# Patient Record
Sex: MALE | Race: WHITE | NOT HISPANIC OR LATINO | URBAN - METROPOLITAN AREA
[De-identification: names, ages, dates, MRNs, and addresses within clinical notes are randomized per-mention and may not be internally consistent; named-entity substitution may affect disease eponyms.]

---

## 2017-01-01 ENCOUNTER — INPATIENT (INPATIENT)
Facility: HOSPITAL | Age: 22
LOS: 9 days | Discharge: HOME | End: 2017-01-11
Attending: PSYCHIATRY & NEUROLOGY | Admitting: PSYCHIATRY & NEUROLOGY
Payer: COMMERCIAL

## 2017-01-01 VITALS
OXYGEN SATURATION: 100 % | SYSTOLIC BLOOD PRESSURE: 153 MMHG | TEMPERATURE: 98 F | DIASTOLIC BLOOD PRESSURE: 96 MMHG | RESPIRATION RATE: 16 BRPM | HEART RATE: 116 BPM

## 2017-01-01 DIAGNOSIS — F29 UNSPECIFIED PSYCHOSIS NOT DUE TO A SUBSTANCE OR KNOWN PHYSIOLOGICAL CONDITION: ICD-10-CM

## 2017-01-01 DIAGNOSIS — R69 ILLNESS, UNSPECIFIED: ICD-10-CM

## 2017-01-01 LAB
ALBUMIN SERPL ELPH-MCNC: 5 G/DL — SIGNIFICANT CHANGE UP (ref 3.3–5)
ALP SERPL-CCNC: 57 U/L — LOW (ref 60–270)
ALT FLD-CCNC: 27 U/L — SIGNIFICANT CHANGE UP (ref 4–41)
APAP SERPL-MCNC: < 15 UG/ML — LOW (ref 15–25)
AST SERPL-CCNC: 24 U/L — SIGNIFICANT CHANGE UP (ref 4–40)
BARBITURATES MEASUREMENT: NEGATIVE — SIGNIFICANT CHANGE UP
BASOPHILS # BLD AUTO: 0.03 K/UL — SIGNIFICANT CHANGE UP (ref 0–0.2)
BASOPHILS NFR BLD AUTO: 0.3 % — SIGNIFICANT CHANGE UP (ref 0–2)
BENZODIAZ SERPL-MCNC: NEGATIVE — SIGNIFICANT CHANGE UP
BILIRUB SERPL-MCNC: 0.4 MG/DL — SIGNIFICANT CHANGE UP (ref 0.2–1.2)
BUN SERPL-MCNC: 21 MG/DL — SIGNIFICANT CHANGE UP (ref 7–23)
CALCIUM SERPL-MCNC: 9.6 MG/DL — SIGNIFICANT CHANGE UP (ref 8.4–10.5)
CHLORIDE SERPL-SCNC: 100 MMOL/L — SIGNIFICANT CHANGE UP (ref 98–107)
CO2 SERPL-SCNC: 22 MMOL/L — SIGNIFICANT CHANGE UP (ref 22–31)
CREAT SERPL-MCNC: 0.8 MG/DL — SIGNIFICANT CHANGE UP (ref 0.5–1.3)
EOSINOPHIL # BLD AUTO: 0 K/UL — SIGNIFICANT CHANGE UP (ref 0–0.5)
EOSINOPHIL NFR BLD AUTO: 0 % — SIGNIFICANT CHANGE UP (ref 0–6)
ETHANOL BLD-MCNC: < 10 MG/DL — SIGNIFICANT CHANGE UP
GLUCOSE SERPL-MCNC: 104 MG/DL — HIGH (ref 70–99)
HCT VFR BLD CALC: 51.1 % — HIGH (ref 39–50)
HGB BLD-MCNC: 16.7 G/DL — SIGNIFICANT CHANGE UP (ref 13–17)
IMM GRANULOCYTES NFR BLD AUTO: 0.2 % — SIGNIFICANT CHANGE UP (ref 0–1.5)
LYMPHOCYTES # BLD AUTO: 2.61 K/UL — SIGNIFICANT CHANGE UP (ref 1–3.3)
LYMPHOCYTES # BLD AUTO: 24.7 % — SIGNIFICANT CHANGE UP (ref 13–44)
MCHC RBC-ENTMCNC: 29.4 PG — SIGNIFICANT CHANGE UP (ref 27–34)
MCHC RBC-ENTMCNC: 32.7 % — SIGNIFICANT CHANGE UP (ref 32–36)
MCV RBC AUTO: 90 FL — SIGNIFICANT CHANGE UP (ref 80–100)
MONOCYTES # BLD AUTO: 0.5 K/UL — SIGNIFICANT CHANGE UP (ref 0–0.9)
MONOCYTES NFR BLD AUTO: 4.7 % — SIGNIFICANT CHANGE UP (ref 2–14)
NEUTROPHILS # BLD AUTO: 7.4 K/UL — SIGNIFICANT CHANGE UP (ref 1.8–7.4)
NEUTROPHILS NFR BLD AUTO: 70.1 % — SIGNIFICANT CHANGE UP (ref 43–77)
PLATELET # BLD AUTO: 178 K/UL — SIGNIFICANT CHANGE UP (ref 150–400)
PMV BLD: 13.3 FL — HIGH (ref 7–13)
POTASSIUM SERPL-MCNC: 4 MMOL/L — SIGNIFICANT CHANGE UP (ref 3.5–5.3)
POTASSIUM SERPL-SCNC: 4 MMOL/L — SIGNIFICANT CHANGE UP (ref 3.5–5.3)
PROT SERPL-MCNC: 8.1 G/DL — SIGNIFICANT CHANGE UP (ref 6–8.3)
RBC # BLD: 5.68 M/UL — SIGNIFICANT CHANGE UP (ref 4.2–5.8)
RBC # FLD: 13.2 % — SIGNIFICANT CHANGE UP (ref 10.3–14.5)
SALICYLATES SERPL-MCNC: < 5 MG/DL — LOW (ref 15–30)
SODIUM SERPL-SCNC: 142 MMOL/L — SIGNIFICANT CHANGE UP (ref 135–145)
TSH SERPL-MCNC: 2.04 UIU/ML — SIGNIFICANT CHANGE UP (ref 0.5–4.3)
WBC # BLD: 10.56 K/UL — HIGH (ref 3.8–10.5)
WBC # FLD AUTO: 10.56 K/UL — HIGH (ref 3.8–10.5)

## 2017-01-01 PROCEDURE — 99285 EMERGENCY DEPT VISIT HI MDM: CPT

## 2017-01-01 RX ORDER — BENZTROPINE MESYLATE 1 MG
0.5 TABLET ORAL ONCE
Qty: 0 | Refills: 0 | Status: COMPLETED | OUTPATIENT
Start: 2017-01-01 | End: 2017-01-01

## 2017-01-01 RX ORDER — HALOPERIDOL DECANOATE 100 MG/ML
5 INJECTION INTRAMUSCULAR ONCE
Qty: 0 | Refills: 0 | Status: DISCONTINUED | OUTPATIENT
Start: 2017-01-01 | End: 2017-01-11

## 2017-01-01 RX ORDER — BENZTROPINE MESYLATE 1 MG
0.5 TABLET ORAL
Qty: 0 | Refills: 0 | Status: DISCONTINUED | OUTPATIENT
Start: 2017-01-01 | End: 2017-01-02

## 2017-01-01 RX ORDER — DIPHENHYDRAMINE HCL 50 MG
50 CAPSULE ORAL ONCE
Qty: 0 | Refills: 0 | Status: DISCONTINUED | OUTPATIENT
Start: 2017-01-01 | End: 2017-01-11

## 2017-01-01 RX ORDER — HALOPERIDOL DECANOATE 100 MG/ML
5 INJECTION INTRAMUSCULAR ONCE
Qty: 0 | Refills: 0 | Status: COMPLETED | OUTPATIENT
Start: 2017-01-01 | End: 2017-01-01

## 2017-01-01 RX ORDER — HALOPERIDOL DECANOATE 100 MG/ML
5 INJECTION INTRAMUSCULAR EVERY 4 HOURS
Qty: 0 | Refills: 0 | Status: DISCONTINUED | OUTPATIENT
Start: 2017-01-01 | End: 2017-01-11

## 2017-01-01 RX ORDER — DIPHENHYDRAMINE HCL 50 MG
50 CAPSULE ORAL EVERY 4 HOURS
Qty: 0 | Refills: 0 | Status: DISCONTINUED | OUTPATIENT
Start: 2017-01-01 | End: 2017-01-11

## 2017-01-01 RX ADMIN — Medication 0.5 MILLIGRAM(S): at 20:31

## 2017-01-01 RX ADMIN — HALOPERIDOL DECANOATE 5 MILLIGRAM(S): 100 INJECTION INTRAMUSCULAR at 14:12

## 2017-01-01 RX ADMIN — Medication 0.5 MILLIGRAM(S): at 14:12

## 2017-01-01 NOTE — ED BEHAVIORAL HEALTH ASSESSMENT NOTE - DESCRIPTION
Oddly related, giggling, laughing inappropriately, maintained behavioral control, offered Haldol 5 mg po at 1330, which patient agreed to take. None known Single, domiciled with family in [facility] ed. student attending [employer]

## 2017-01-01 NOTE — ED BEHAVIORAL HEALTH ASSESSMENT NOTE - RISK ASSESSMENT
Risk factors- male gender, psychosis, medication non-compliant, psychosocial stressors.  Protective factors- supportive family, domiciled, no history of suicide attempts, no substance abuse, legal issues or access to firearms.  Patient is an acute risk, meets criteria for inpatient hospitalization and will be hospitalized for safety and stabilization.

## 2017-01-01 NOTE — ED BEHAVIORAL HEALTH ASSESSMENT NOTE - PSYCHIATRIC ISSUES AND PLAN (INCLUDE STANDING AND PRN MEDICATION)
Start Haldol 5 mg po BID, Cogentin1 mg po qhs, Haldol 5mg po/Im q 4 hours prn agitation, Ativan 2 mg po/IM q 4 hours prn agitation, Benadryl 50 mg po/Im q 4 hours prn agitation

## 2017-01-01 NOTE — ED BEHAVIORAL HEALTH ASSESSMENT NOTE - HPI (INCLUDE ILLNESS QUALITY, SEVERITY, DURATION, TIMING, CONTEXT, MODIFYING FACTORS, ASSOCIATED SIGNS AND SYMPTOMS)
21 year old, single, college student, domiciled with family in [place], non-caregiver,  male with history of Psychosis not otherwise specified, one prior hospitalization at [facility] in , no history of suicide attempts, no substance abuse, no history of trauma or violence, PMH- non-reported, no legal issues or access to firearms. Brought in by sister for Psychosis, unable to care for self.    Upon interview, patient appears childlike, giggling and smiling inappropriately, states that as of today his name is Augustus Castro, then started to ramble about God, could be redirected. Patient denies all psychiatric symptoms, states he has no psychiatric history, has never been hospitalized, never taken psychiatric meds. The patient denies current suicidal/homicidal ideation, intent or plan. Patient denies any psychotic symptoms including paranoia, ideas of reference, thought insertion/broadcasting, or auditory/visual/olfactory/tactile/gustatory hallucinations. However, patient is not sleeping, "can't remember when I last slept", not showering, not functioning at home. Denies substance use.    Collateral- spoke in person with patient's sister, Charlene Terry, [phone] - she reports that the patient has diagnosis of Psychosis not otherwise specified, the patient was hospitalized at [facility] in , after his father was diagnosed with metastatic melanoma, his mother  when he was  when he was a child. The patient had been doing well on Haldol 5 mg po and Cogentin 2 mg po, but his brother stopped the medication 6 months ago because "he thought my brother did not need it". The sister reports the patient has been decompensating over the last 3 weeks. he is not sleeping, acting    Spoke with Dr. Sung- [phone] - who is a psychologist has only been seeing the patient a few weeks. She reports the family reported that the patient was anxious and obsessive, with poor hygiene, the pt's father has stage 4 cancer and is "not doing well" and was in the hospital over the weekend. She wanted the patient to see a psychiatristghazal 21 year old, single, college student, domiciled with family in [place], non-caregiver,  male with history of Psychosis not otherwise specified, one prior hospitalization at [facility] in , no history of suicide attempts, no substance abuse, no history of trauma or violence, PMH- non-reported, no legal issues or access to firearms. Brought in by sister for Psychosis, unable to care for self.    Upon interview, patient appears childlike, giggling and smiling inappropriately, states that as of today his name is Augustus Castro, then started to ramble about God, could be redirected. Patient denies all psychiatric symptoms, states he has no psychiatric history, has never been hospitalized, never taken psychiatric meds. The patient denies current suicidal/homicidal ideation, intent or plan. Patient denies any psychotic symptoms including paranoia, ideas of reference, thought insertion/broadcasting, or auditory/visual/olfactory/tactile/gustatory hallucinations. However, patient is not sleeping, "can't remember when I last slept", not showering, not functioning at home, unable to complete school work. Denies substance use.    Collateral- spoke in person with patient's sister, Charlene Terry, [phone] - she reports that the patient has diagnosis of Psychosis not otherwise specified, the patient was hospitalized at [facility] in , after his father was diagnosed with metastatic melanoma, his mother  of cancer when he was a child.  The patient had been doing well on Haldol 5 mg po and Cogentin 2 mg po, but his brother stopped the medication 6 months ago because "he thought my brother did not need it". The sister reports the patient has been decompensating over the last 3 weeks. he is not sleeping, acting bizarrely, with poor ADL's, feeling quite anxious and paranoid. She is unaware of any substance use. Denies the patient has made any suicidal statements. No history of aggression. She reports the patient attends a [employer] college, but now is unable to complete school work.     Spoke with Dr. Sung- [phone] - who is a psychologist has only been seeing the patient a few weeks. She reports the family reported that the patient was anxious and obsessive, paranoid, with extremely poor hygiene, the pt's father has stage 4 cancer and is "not doing well" and was in the hospital over the weekend. She wanted the patient to see a psychiatrist, but has been unable to find someone. She reports the pateint's mother was Bipolar with psychotic features. 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 Brought in by sister for Psychosis, unable to care for self.    Upon interview, patient appears childlike, giggling and smiling inappropriately, states that as of today his name is Augustus Castro, then started to ramble about God, could be redirected. Patient denies all psychiatric symptoms, states he has no psychiatric history, has never been hospitalized, never taken psychiatric meds. The patient denies current suicidal/homicidal ideation, intent or plan. Patient denies any psychotic symptoms including paranoia, ideas of reference, thought insertion/broadcasting, or auditory/visual/olfactory/tactile/gustatory hallucinations. However, patient is not sleeping, "can't remember when I last slept", not showering, not functioning at home, unable to complete school work. Denies substance use.    Collateral- spoke in person with patient's sister, Charlnee Terry, [phone] - she reports that the patient has diagnosis of Psychosis not otherwise specified, the patient was hospitalized at [facility] in , after his father was diagnosed with metastatic cancer, his mother  of cancer when he was a child.  The patient had been doing well on Haldol 5 mg po and Cogentin 2 mg po, but his brother stopped the medication 6 months ago because "he thought my brother did not need it". The sister reports the patient has been decompensating over the last 3 weeks. he is not sleeping, acting bizarrely, with poor ADL's, feeling quite anxious and paranoid. She is unaware of any substance use. Denies the patient has made any suicidal statements. No history of aggression. She reports the patient attends a [employer] college, is a "special ed student", but now is unable to complete school work.     Spoke with Dr. Sung- [phone] - who is a psychologist has only been seeing the patient a few weeks. She reports the family reported that the patient was anxious and obsessive, paranoid, with extremely poor hygiene, the pt's father has stage 4 cancer and is "not doing well" and was in the hospital over the weekend. She wanted the patient to see a psychiatrist, but has been unable to find someone. She reports the pateint's mother was Bipolar with psychotic features. 21 year old, single, college student, domiciled with family in [place], non-caregiver, Yarsanism Religion,  male with history of Psychosis not otherwise specified, one prior hospitalization at [facility] in , no history of suicide attempts, no substance abuse, no history of trauma or violence, PMH- non-reported, no legal issues or access to firearms. Brought in by sister for Psychosis, unable to care for self.    Upon interview, patient appears childlike, giggling and smiling inappropriately, states that as of today his name is Augustus Castro, then started to ramble about God, could not be redirected. Patient denies all psychiatric symptoms, states he has no psychiatric history, has never been hospitalized, never taken psychiatric meds. The patient denies current suicidal/homicidal ideation, intent or plan. Patient denies any psychotic symptoms including paranoia, ideas of reference, thought insertion/broadcasting, or auditory/visual/olfactory/tactile/gustatory hallucinations. However, patient is not sleeping, "can't remember when I last slept", not showering, not functioning at home, unable to complete school work. Denies substance use.    Collateral- spoke in person with patient's sister, Charlene Terry, [phone] - she reports that the patient has diagnosis of Psychosis not otherwise specified, the patient was hospitalized at [facility] in , after his father was diagnosed with metastatic cancer, his mother  of cancer when he was a child.  The patient had been doing well on Haldol 5 mg po and Cogentin 2 mg po, but his brother stopped the medication 6 months ago because "he thought my brother did not need it". The sister reports the patient has been decompensating over the last 3 weeks. he is not sleeping, acting bizarrely, with poor ADL's, feeling quite anxious and paranoid. She is unaware of any substance use. Denies the patient has made any suicidal statements. No history of aggression. She reports the patient attends a [employer] college, is a "special ed student", but now is unable to complete school work.     Spoke with Dr. Sung- [phone] - who is a psychologist has only been seeing the patient a few weeks. She reports the family reported that the patient was anxious and obsessive, paranoid, with extremely poor hygiene, the pt's father has stage 4 cancer and is "not doing well" and was in the hospital over the weekend. She wanted the patient to see a psychiatrist, but has been unable to find someone. She reports the pateint's mother was Bipolar with psychotic features.

## 2017-01-01 NOTE — ED BEHAVIORAL HEALTH ASSESSMENT NOTE - SUMMARY
21 year old, single, college student, domiciled with family in [place], non-caregiver, Religion Religion,  male with history of Psychosis not otherwise specified, one prior hospitalization at [facility] in 2015, no history of suicide attempts, no substance abuse, no history of trauma or violence, PMH- non-reported, no legal issues or access to firearms. Brought in by sister for Psychosis, unable to care for self.    Patient is psychotic, delusional that he is Augustus Jobs, paranoid, with global insomnia, poor hygiene, poor functioning, unable to care for self, is an acute danger to himself and others, requires inpatient hospitalization, 9.39,  for safety and stabilization

## 2017-01-01 NOTE — ED ADULT NURSE REASSESSMENT NOTE - NS ED NURSE REASSESS COMMENT FT1
Patient received resting comfortably in bed. When asked name, patient states "Augustus Jobs" and begins laughing. Refuses to answer any other question or provide correct name. Patient is disheveled and unkempt, laughing and smiling to himself, appears internally preoccupied. NAD. Safety and comfort measures maintained. Superficially receptive to staff support/reassurance.

## 2017-01-01 NOTE — ED PROVIDER NOTE - NS ED ATTENDING STATEMENT MOD
I have reviewed and agree with all pertinent clinical information, including history and physical exam and agree with treatment plan of the NP.

## 2017-01-01 NOTE — ED PROVIDER NOTE - MEDICAL DECISION MAKING DETAILS
22 y/o M hx Psychosis  Labs, Urine Tox/UA, EKG. Hydration .  Medical evaluation performed. There is no clinical evidence of intoxication or any acute medical problem requiring immediate intervention. Patient is awaiting psychiatric consultation. Final disposition will be determined by psychiatrist.

## 2017-01-01 NOTE — ED BEHAVIORAL HEALTH NOTE - BEHAVIORAL HEALTH NOTE
COURTNEY contacted pt insurance company, [employer], at [phone] re: obtaining pre-authorization for pt admission to [facility].  COURTNEY spoke with Laura-as per Laura, she does not see pt in the system and cannot verify his eligibility for insurance coverage.  Laura states that member services will be open on Tuesday January 3, 2017 and pt's eligibility can be verified then.  At this time, pre-certification cannot be started on pt admission.

## 2017-01-01 NOTE — ED BEHAVIORAL HEALTH ASSESSMENT NOTE - CASE SUMMARY
21M with a history of psychosis, non compliant with meds for several months, presents with EMS called by family for delusions. On exam patient is euphoric, smiles inappropriately and appears to be responding to internal stimuli. He refers to himself as "dinesh gao' and only answers to that name. he initially walked around the ER with his gown open and chest exposed, later observed by RN to have hand down his pants, and was observed standing still and staring at the wall for several minutes. He is thought disordered, grandiose and delusional, likely hallucinating, and not caring for hygiene/basic needs. He cannot care for himself due to psychosis/norm and requires admission for safety and stabilization. Admit to 1S, 939, no 1:1 needed as pt is not suicidal or violent. Resume Haldol and Cogentin, first dose given in ED. EMS transport to unit. I agree with remaining plan as above.

## 2017-01-01 NOTE — ED PROVIDER NOTE - OBJECTIVE STATEMENT
20 y/o M BIBA activated by sister w c/o bizarre behaviour and psychosis. States " My sister thinks Im psychotic". States " I smoke pink- the color of love". Denies punching or kicking any objects. Denies pain, SOB, fever, chills, chest/ abdominal discomfort. Denies SI/HI/AH/VH. Denies use of alcohol or illicit drugs. 20 y/o M hx Psychosis BIBA activated by sister w c/o bizarre behaviour and psychosis. States " My sister thinks I'm psychotic". States " I smoke pink- the color of 'love". Sister states that patient's conditions has worsened secondary to father's diagnosis of melanoma. Sister states that patient has taken a total of  6  vitamin B and D pills today. Denies punching or kicking any objects. Denies pain, SOB, fever, chills, chest/ abdominal discomfort. Denies SI/HI/AH/VH. Denies use of alcohol or illicit drugs. 20 y/o M hx Psychosis BIBA activated by sister w c/o bizarre behaviour and psychosis. States " My sister thinks I'm psychotic". States " I smoke pink- the color of 'love". Sister states that patient's conditions has worsened secondary to father's diagnosis of melanoma. Sister states that patient has taken a total of  3  vitamin B and D pills today. Denies punching or kicking any objects. Denies pain, SOB, fever, chills, chest/ abdominal discomfort. Denies SI/HI/AH/VH. Denies use of alcohol or illicit drugs.

## 2017-01-01 NOTE — ED BEHAVIORAL HEALTH ASSESSMENT NOTE - DETAILS
Mother- Bipolar with psychotic features Father- metastatic cancer, mother- cancer R2 Sister and psychologist aware of plan for admission R2 - Dr. Narayan at 1500

## 2017-01-01 NOTE — ED BEHAVIORAL HEALTH ASSESSMENT NOTE - OTHER PAST PSYCHIATRIC HISTORY (INCLUDE DETAILS REGARDING ONSET, COURSE OF ILLNESS, INPATIENT/OUTPATIENT TREATMENT)
Diagnosed in 2015 with Psychosis not otherwise specified, one prior psychiatric hospitalization at [facility] in 2015, recently started seeing a psychologist, has no  outpatient psychiatrist/provider. No history of suicide attempts.

## 2017-01-02 PROCEDURE — 99232 SBSQ HOSP IP/OBS MODERATE 35: CPT

## 2017-01-02 RX ORDER — HALOPERIDOL DECANOATE 100 MG/ML
5 INJECTION INTRAMUSCULAR
Qty: 0 | Refills: 0 | Status: DISCONTINUED | OUTPATIENT
Start: 2017-01-02 | End: 2017-01-06

## 2017-01-02 RX ORDER — BENZTROPINE MESYLATE 1 MG
1 TABLET ORAL
Qty: 0 | Refills: 0 | Status: DISCONTINUED | OUTPATIENT
Start: 2017-01-02 | End: 2017-01-11

## 2017-01-02 RX ORDER — DIPHENHYDRAMINE HCL 50 MG
50 CAPSULE ORAL AT BEDTIME
Qty: 0 | Refills: 0 | Status: DISCONTINUED | OUTPATIENT
Start: 2017-01-02 | End: 2017-01-11

## 2017-01-02 RX ADMIN — Medication 50 MILLIGRAM(S): at 01:46

## 2017-01-02 RX ADMIN — HALOPERIDOL DECANOATE 5 MILLIGRAM(S): 100 INJECTION INTRAMUSCULAR at 16:25

## 2017-01-02 RX ADMIN — Medication 0.5 MILLIGRAM(S): at 09:19

## 2017-01-02 RX ADMIN — Medication 50 MILLIGRAM(S): at 16:25

## 2017-01-02 RX ADMIN — Medication 50 MILLIGRAM(S): at 20:58

## 2017-01-02 RX ADMIN — Medication 1 MILLIGRAM(S): at 20:58

## 2017-01-02 RX ADMIN — HALOPERIDOL DECANOATE 5 MILLIGRAM(S): 100 INJECTION INTRAMUSCULAR at 20:58

## 2017-01-02 RX ADMIN — Medication 1 MILLIGRAM(S): at 16:25

## 2017-01-03 LAB
CHOLEST SERPL-MCNC: 141 MG/DL — SIGNIFICANT CHANGE UP (ref 120–199)
HBA1C BLD-MCNC: 4.8 % — SIGNIFICANT CHANGE UP (ref 4–5.6)
HDLC SERPL-MCNC: 53 MG/DL — SIGNIFICANT CHANGE UP (ref 35–55)
LIPID PNL WITH DIRECT LDL SERPL: 84 MG/DL — SIGNIFICANT CHANGE UP
PROLACTIN SERPL-MCNC: 31.8 NG/ML — HIGH (ref 4–15.2)
TRIGL SERPL-MCNC: 76 MG/DL — SIGNIFICANT CHANGE UP (ref 10–149)

## 2017-01-03 PROCEDURE — 99232 SBSQ HOSP IP/OBS MODERATE 35: CPT

## 2017-01-03 RX ADMIN — Medication 50 MILLIGRAM(S): at 20:49

## 2017-01-03 RX ADMIN — Medication 1 MILLIGRAM(S): at 21:12

## 2017-01-03 RX ADMIN — HALOPERIDOL DECANOATE 5 MILLIGRAM(S): 100 INJECTION INTRAMUSCULAR at 20:49

## 2017-01-03 RX ADMIN — Medication 1 MILLIGRAM(S): at 20:49

## 2017-01-03 RX ADMIN — HALOPERIDOL DECANOATE 5 MILLIGRAM(S): 100 INJECTION INTRAMUSCULAR at 08:44

## 2017-01-03 RX ADMIN — Medication 1 MILLIGRAM(S): at 08:44

## 2017-01-04 PROCEDURE — 99232 SBSQ HOSP IP/OBS MODERATE 35: CPT

## 2017-01-04 RX ADMIN — Medication 50 MILLIGRAM(S): at 21:39

## 2017-01-04 RX ADMIN — Medication 1 MILLIGRAM(S): at 22:43

## 2017-01-04 RX ADMIN — Medication 1 MILLIGRAM(S): at 09:15

## 2017-01-04 RX ADMIN — HALOPERIDOL DECANOATE 5 MILLIGRAM(S): 100 INJECTION INTRAMUSCULAR at 20:56

## 2017-01-04 RX ADMIN — HALOPERIDOL DECANOATE 5 MILLIGRAM(S): 100 INJECTION INTRAMUSCULAR at 09:15

## 2017-01-04 RX ADMIN — Medication 1 MILLIGRAM(S): at 20:56

## 2017-01-04 NOTE — ED BEHAVIORAL HEALTH NOTE - BEHAVIORAL HEALTH NOTE
Per previous SW note, pt believed to have BC[employer] insurance. COURTNEY informed by [facility] today pt has insurance through [employer]. COURTNEY Called [phone] and spoke to reviewer Wes SWENSON SW was provided auth #[id number] for 6 days 1/1/17-1/6/17 with recertification due on or before 1/6/17. Reviewer Harvey BAZZI [phone] should be called with all updates for next review. Length of call 40mins. [facility] informed of auth.

## 2017-01-05 PROCEDURE — 99232 SBSQ HOSP IP/OBS MODERATE 35: CPT

## 2017-01-05 RX ADMIN — Medication 1 MILLIGRAM(S): at 22:09

## 2017-01-05 RX ADMIN — Medication 1 MILLIGRAM(S): at 09:03

## 2017-01-05 RX ADMIN — HALOPERIDOL DECANOATE 5 MILLIGRAM(S): 100 INJECTION INTRAMUSCULAR at 22:09

## 2017-01-05 RX ADMIN — HALOPERIDOL DECANOATE 5 MILLIGRAM(S): 100 INJECTION INTRAMUSCULAR at 09:03

## 2017-01-06 PROCEDURE — 99232 SBSQ HOSP IP/OBS MODERATE 35: CPT

## 2017-01-06 RX ORDER — HALOPERIDOL DECANOATE 100 MG/ML
7.5 INJECTION INTRAMUSCULAR
Qty: 0 | Refills: 0 | Status: DISCONTINUED | OUTPATIENT
Start: 2017-01-06 | End: 2017-01-11

## 2017-01-06 RX ADMIN — Medication 1 MILLIGRAM(S): at 21:48

## 2017-01-06 RX ADMIN — HALOPERIDOL DECANOATE 7.5 MILLIGRAM(S): 100 INJECTION INTRAMUSCULAR at 21:48

## 2017-01-06 RX ADMIN — HALOPERIDOL DECANOATE 5 MILLIGRAM(S): 100 INJECTION INTRAMUSCULAR at 08:31

## 2017-01-06 RX ADMIN — Medication 1 MILLIGRAM(S): at 08:31

## 2017-01-07 RX ADMIN — Medication 1 MILLIGRAM(S): at 09:17

## 2017-01-07 RX ADMIN — HALOPERIDOL DECANOATE 7.5 MILLIGRAM(S): 100 INJECTION INTRAMUSCULAR at 09:17

## 2017-01-07 RX ADMIN — HALOPERIDOL DECANOATE 7.5 MILLIGRAM(S): 100 INJECTION INTRAMUSCULAR at 22:03

## 2017-01-07 RX ADMIN — Medication 1 MILLIGRAM(S): at 22:03

## 2017-01-08 RX ADMIN — Medication 1 MILLIGRAM(S): at 08:41

## 2017-01-08 RX ADMIN — HALOPERIDOL DECANOATE 7.5 MILLIGRAM(S): 100 INJECTION INTRAMUSCULAR at 08:41

## 2017-01-09 PROCEDURE — 99232 SBSQ HOSP IP/OBS MODERATE 35: CPT

## 2017-01-09 RX ADMIN — Medication 1 MILLIGRAM(S): at 08:02

## 2017-01-09 RX ADMIN — Medication 1 MILLIGRAM(S): at 21:21

## 2017-01-09 RX ADMIN — HALOPERIDOL DECANOATE 7.5 MILLIGRAM(S): 100 INJECTION INTRAMUSCULAR at 08:02

## 2017-01-09 RX ADMIN — HALOPERIDOL DECANOATE 7.5 MILLIGRAM(S): 100 INJECTION INTRAMUSCULAR at 21:21

## 2017-01-10 PROCEDURE — 99232 SBSQ HOSP IP/OBS MODERATE 35: CPT

## 2017-01-10 RX ORDER — HALOPERIDOL DECANOATE 100 MG/ML
1.5 INJECTION INTRAMUSCULAR
Qty: 90 | Refills: 0 | OUTPATIENT
Start: 2017-01-10 | End: 2017-02-09

## 2017-01-10 RX ORDER — BENZTROPINE MESYLATE 1 MG
1 TABLET ORAL
Qty: 60 | Refills: 0 | OUTPATIENT
Start: 2017-01-10 | End: 2017-02-09

## 2017-01-10 RX ADMIN — Medication 1 MILLIGRAM(S): at 08:30

## 2017-01-10 RX ADMIN — HALOPERIDOL DECANOATE 7.5 MILLIGRAM(S): 100 INJECTION INTRAMUSCULAR at 08:30

## 2017-01-10 RX ADMIN — Medication 1 MILLIGRAM(S): at 20:34

## 2017-01-10 RX ADMIN — HALOPERIDOL DECANOATE 7.5 MILLIGRAM(S): 100 INJECTION INTRAMUSCULAR at 20:35

## 2017-01-11 VITALS — TEMPERATURE: 97 F

## 2017-01-11 PROCEDURE — 99238 HOSP IP/OBS DSCHRG MGMT 30/<: CPT

## 2017-01-11 RX ADMIN — HALOPERIDOL DECANOATE 7.5 MILLIGRAM(S): 100 INJECTION INTRAMUSCULAR at 08:43

## 2017-01-11 RX ADMIN — Medication 1 MILLIGRAM(S): at 08:43

## 2017-02-08 RX ORDER — BENZTROPINE MESYLATE 1 MG
1 TABLET ORAL
Qty: 28 | Refills: 0 | OUTPATIENT
Start: 2017-02-08 | End: 2017-02-22

## 2017-02-08 RX ORDER — HALOPERIDOL DECANOATE 100 MG/ML
1.5 INJECTION INTRAMUSCULAR
Qty: 42 | Refills: 0 | OUTPATIENT
Start: 2017-02-08 | End: 2017-02-22

## 2017-05-30 NOTE — ED BEHAVIORAL HEALTH ASSESSMENT NOTE - BODY HABITUS
[facility]  [address]  [facility] [address]  Phone: [phone]    Name:  Rahel Tavarez  Current Date: May 30, 2017  : 1951  MRN: [id number]   PAWEL: [id number]    Visit Date: 2017  Address:  [facility] [address]  Phone: [phone]    Primary Care Provider     Name: KALIE Guidry    Phone: [phone]    The staff of [facility] would like to thank you for allowing us to assist you with your healthcare needs. The following includes patient education materials and information on how best to care for your illness/injury at home and when to see a physician. If you need to locate a Doctor or clinic close to you, please call the Doctor Referral Service at [phone]. The Service is available Monday through Thursday from 8 AM to 8 PM and  from 8 AM to 4 PM.    Patients Please Note: If further time off is required, or a medical clearance to return to work is required, it must be obtained through your primary physician.  Return to work clearances and extensions of \"Time-Off\" will not be given by the Emergency Department.     We hope that you leave our Emergency Department believing that we provided you with very good care.   Your Opinion Matters To Us  If you receive a patient satisfaction survey in the mail, please complete and return it in the postage-paid envelope.  We truly value and appreciate your feedback.  Emergency Department Care Providers   Physician:Imelda Quinn DO    Advanced Practice Provider:  Physician Assistant: JORDAN Santos     RN_________________ ED Tech__________________ Clerical_________________         Overweight

## 2018-02-25 ENCOUNTER — EMERGENCY (EMERGENCY)
Facility: HOSPITAL | Age: 23
LOS: 1 days | Discharge: HOME | End: 2018-02-25
Admitting: EMERGENCY MEDICINE
Payer: COMMERCIAL

## 2018-02-25 VITALS
TEMPERATURE: 98 F | RESPIRATION RATE: 20 BRPM | HEART RATE: 105 BPM | OXYGEN SATURATION: 100 % | DIASTOLIC BLOOD PRESSURE: 93 MMHG | SYSTOLIC BLOOD PRESSURE: 152 MMHG

## 2018-02-25 PROBLEM — F29 UNSPECIFIED PSYCHOSIS NOT DUE TO A SUBSTANCE OR KNOWN PHYSIOLOGICAL CONDITION: Chronic | Status: ACTIVE | Noted: 2017-01-01

## 2018-02-25 PROCEDURE — 99283 EMERGENCY DEPT VISIT LOW MDM: CPT

## 2018-02-25 RX ORDER — HALOPERIDOL DECANOATE 100 MG/ML
5 INJECTION INTRAMUSCULAR ONCE
Qty: 0 | Refills: 0 | Status: COMPLETED | OUTPATIENT
Start: 2018-02-25 | End: 2018-02-25

## 2018-02-25 RX ADMIN — HALOPERIDOL DECANOATE 5 MILLIGRAM(S): 100 INJECTION INTRAMUSCULAR at 15:08

## 2018-02-25 NOTE — ED PROVIDER NOTE - OBJECTIVE STATEMENT
23 y/o M hx  Schizoaffective D/O BIB w  c/o  intermittent episodes of anxiety . Sister states that patient recently had his haldol increase to 5 mg .  Denies falling ,punching or kicking any objects.  Denies  pain, SOB, fever, chills , chest/abdominal discomfort. Denies SI/HI/VH/AH.  Denies recent use of alcohol or illicit drug.

## 2018-02-25 NOTE — ED ADULT TRIAGE NOTE - CHIEF COMPLAINT QUOTE
Patient brought to ER from home by EMS for norm for 5 days. Haldol was upped to 10 mg a day BID for a day from 5mg. Pt needs his medication adjustment.

## 2018-02-25 NOTE — ED PROVIDER NOTE - MEDICAL DECISION MAKING DETAILS
21 y/o M hx  Schizoaffective D/O  Haldol IM x 1 given. To follow up with [facility]     Medical evaluation performed. There is no clinical evidence of intoxication or any acute medical problem requiring immediate intervention.

## 2020-02-20 NOTE — ED PROVIDER NOTE - TIMING
intermittent
check echo  monitor i/os  hold off on diuretics unless clinically warranted as pt has cr 1.7 todays  apprec renal collaboration

## 2020-08-12 NOTE — ED ADULT TRIAGE NOTE - CCCP TRG CHIEF CMPLNT
Masood Eval Cephalexin Counseling: I counseled the patient regarding use of cephalexin as an antibiotic for prophylactic and/or therapeutic purposes. Cephalexin (commonly prescribed under brand name Keflex) is a cephalosporin antibiotic which is active against numerous classes of bacteria, including most skin bacteria. Side effects may include nausea, diarrhea, gastrointestinal upset, rash, hives, yeast infections, and in rare cases, hepatitis, kidney disease, seizures, fever, confusion, neurologic symptoms, and others. Patients with severe allergies to penicillin medications are cautioned that there is about a 10% incidence of cross-reactivity with cephalosporins. When possible, patients with penicillin allergies should use alternatives to cephalosporins for antibiotic therapy.

## 2022-10-05 NOTE — ED ADULT NURSE NOTE - CAS DISCH CONDITION
Patient is currently on Escitalopram 20 mg daily.    Appears to have been on this dose since April 2018    Left  for return call at earliest availability.    Fair

## 2022-12-21 NOTE — ED PROVIDER NOTE - PSYCHIATRIC MOOD
Aleida, an RN from Haven Behavioral Ximena Sylvester RN     ANXIOUS/AGITATED
